# Patient Record
Sex: FEMALE | Race: BLACK OR AFRICAN AMERICAN | NOT HISPANIC OR LATINO | Employment: UNEMPLOYED | URBAN - METROPOLITAN AREA
[De-identification: names, ages, dates, MRNs, and addresses within clinical notes are randomized per-mention and may not be internally consistent; named-entity substitution may affect disease eponyms.]

---

## 2019-05-10 PROBLEM — M41.9 SCOLIOSIS OF LUMBAR SPINE: Status: ACTIVE | Noted: 2019-05-10

## 2019-08-04 PROBLEM — G89.29 CHRONIC BACK PAIN: Status: ACTIVE | Noted: 2019-08-04

## 2019-08-04 PROBLEM — M79.605 PAIN IN BOTH LOWER EXTREMITIES: Status: ACTIVE | Noted: 2019-08-04

## 2019-08-04 PROBLEM — M41.125 ADOLESCENT IDIOPATHIC SCOLIOSIS OF THORACOLUMBAR REGION: Status: ACTIVE | Noted: 2019-08-04

## 2019-08-04 PROBLEM — M54.9 CHRONIC BACK PAIN: Status: ACTIVE | Noted: 2019-08-04

## 2019-08-04 PROBLEM — M79.604 PAIN IN BOTH LOWER EXTREMITIES: Status: ACTIVE | Noted: 2019-08-04

## 2020-01-15 PROBLEM — M43.25 FUSION OF SPINE OF THORACOLUMBAR REGION: Status: ACTIVE | Noted: 2020-01-15

## 2020-01-22 PROBLEM — N92.6 IRREGULAR MENSTRUATION: Status: ACTIVE | Noted: 2020-01-22

## 2020-01-22 PROBLEM — N89.8 VAGINAL DISCHARGE: Status: ACTIVE | Noted: 2020-01-22

## 2020-01-22 PROBLEM — R63.0 DECREASED APPETITE: Status: ACTIVE | Noted: 2020-01-22

## 2020-01-22 PROBLEM — R63.4 WEIGHT LOSS: Status: ACTIVE | Noted: 2020-01-22

## 2020-02-05 PROBLEM — N91.1 SECONDARY AMENORRHEA: Status: ACTIVE | Noted: 2020-02-05

## 2020-05-11 PROBLEM — B37.31 VAGINAL YEAST INFECTION: Status: ACTIVE | Noted: 2020-05-11

## 2020-05-11 PROBLEM — L29.9 ITCHING: Status: ACTIVE | Noted: 2020-05-11

## 2020-09-22 PROBLEM — S93.105A: Status: ACTIVE | Noted: 2020-09-22

## 2020-09-22 PROBLEM — Z91.040 LATEX ALLERGY: Status: ACTIVE | Noted: 2018-11-12

## 2020-09-24 PROBLEM — N89.8 VAGINAL DISCHARGE: Status: RESOLVED | Noted: 2020-01-22 | Resolved: 2020-09-24

## 2020-09-24 PROBLEM — L29.9 ITCHING: Status: RESOLVED | Noted: 2020-05-11 | Resolved: 2020-09-24

## 2020-09-24 PROBLEM — N91.1 SECONDARY AMENORRHEA: Status: RESOLVED | Noted: 2020-02-05 | Resolved: 2020-09-24

## 2020-09-24 PROBLEM — N92.6 IRREGULAR MENSTRUATION: Status: RESOLVED | Noted: 2020-01-22 | Resolved: 2020-09-24

## 2020-09-24 PROBLEM — M79.604 PAIN IN BOTH LOWER EXTREMITIES: Status: RESOLVED | Noted: 2019-08-04 | Resolved: 2020-09-24

## 2020-09-24 PROBLEM — M79.605 PAIN IN BOTH LOWER EXTREMITIES: Status: RESOLVED | Noted: 2019-08-04 | Resolved: 2020-09-24

## 2020-09-24 PROBLEM — B37.31 VAGINAL YEAST INFECTION: Status: RESOLVED | Noted: 2020-05-11 | Resolved: 2020-09-24

## 2020-09-24 PROBLEM — Z01.818 PRE-OP EVALUATION: Status: ACTIVE | Noted: 2020-09-24

## 2020-09-24 PROBLEM — F12.90 MARIJUANA USE: Status: ACTIVE | Noted: 2020-09-24

## 2020-09-25 PROBLEM — E55.9 VITAMIN D DEFICIENCY: Chronic | Status: ACTIVE | Noted: 2020-09-25

## 2020-09-27 PROBLEM — R63.4 WEIGHT LOSS: Status: RESOLVED | Noted: 2020-01-22 | Resolved: 2020-09-27

## 2020-09-27 PROBLEM — Z91.040 LATEX ALLERGY: Status: RESOLVED | Noted: 2018-11-12 | Resolved: 2020-09-27

## 2020-09-27 PROBLEM — Z01.818 PRE-OP EVALUATION: Status: RESOLVED | Noted: 2020-09-24 | Resolved: 2020-09-27

## 2020-09-27 PROBLEM — R63.0 DECREASED APPETITE: Status: RESOLVED | Noted: 2020-01-22 | Resolved: 2020-09-27

## 2020-09-27 PROBLEM — M43.25 FUSION OF SPINE OF THORACOLUMBAR REGION: Status: RESOLVED | Noted: 2020-01-15 | Resolved: 2020-09-27

## 2020-09-27 PROBLEM — M41.125 ADOLESCENT IDIOPATHIC SCOLIOSIS OF THORACOLUMBAR REGION: Status: RESOLVED | Noted: 2019-08-04 | Resolved: 2020-09-27

## 2020-09-27 PROBLEM — M41.116 JUVENILE IDIOPATHIC SCOLIOSIS OF LUMBAR REGION: Status: ACTIVE | Noted: 2019-05-10

## 2020-09-28 PROBLEM — Z98.890 STATUS POST SURGERY: Status: ACTIVE | Noted: 2020-09-28

## 2020-09-28 PROBLEM — S93.105A: Status: ACTIVE | Noted: 2020-09-28

## 2021-02-26 PROBLEM — M25.511 RIGHT SHOULDER PAIN: Status: ACTIVE | Noted: 2021-02-26

## 2021-02-26 PROBLEM — Z20.2 STD EXPOSURE: Status: ACTIVE | Noted: 2021-02-26

## 2021-05-12 ENCOUNTER — PATIENT MESSAGE (OUTPATIENT)
Dept: RESEARCH | Facility: HOSPITAL | Age: 26
End: 2021-05-12

## 2021-11-12 PROBLEM — Z20.2 STD EXPOSURE: Status: RESOLVED | Noted: 2021-02-26 | Resolved: 2021-11-12

## 2022-01-19 ENCOUNTER — PATIENT MESSAGE (OUTPATIENT)
Dept: ADMINISTRATIVE | Facility: OTHER | Age: 27
End: 2022-01-19
Payer: MEDICAID

## 2022-09-19 PROBLEM — R22.31 LUMP IN ARMPIT, RIGHT: Status: ACTIVE | Noted: 2022-09-19

## 2023-06-13 ENCOUNTER — PATIENT MESSAGE (OUTPATIENT)
Dept: RESEARCH | Facility: HOSPITAL | Age: 28
End: 2023-06-13
Payer: MEDICAID

## 2023-06-20 ENCOUNTER — PATIENT MESSAGE (OUTPATIENT)
Dept: RESEARCH | Facility: HOSPITAL | Age: 28
End: 2023-06-20
Payer: MEDICAID

## 2023-07-05 ENCOUNTER — PATIENT MESSAGE (OUTPATIENT)
Dept: RESEARCH | Facility: HOSPITAL | Age: 28
End: 2023-07-05
Payer: MEDICAID

## 2024-07-08 PROBLEM — Z98.890 STATUS POST SURGERY: Status: RESOLVED | Noted: 2020-09-28 | Resolved: 2024-07-08

## 2024-07-08 PROBLEM — R22.31 LUMP IN ARMPIT, RIGHT: Status: RESOLVED | Noted: 2022-09-19 | Resolved: 2024-07-08

## 2025-04-11 ENCOUNTER — ON-DEMAND VIRTUAL (OUTPATIENT)
Dept: URGENT CARE | Facility: CLINIC | Age: 30
End: 2025-04-11
Payer: MEDICAID

## 2025-04-11 DIAGNOSIS — N64.52 NIPPLE DISCHARGE IN FEMALE: Primary | ICD-10-CM

## 2025-04-11 NOTE — PROGRESS NOTES
Subjective:      Patient ID: Michael Parnell is a 29 y.o. female.    Vitals:  vitals were not taken for this visit.     Chief Complaint: Breast Problem      Visit Type: TELE AUDIOVISUAL - This visit was conducted virtually based on  subjective information and limited objective exam    Present with the patient at the time of consultation: TELEMED PRESENT WITH PATIENT: None  LOCATION OF PATIENT [place]  Two patient identifiers used to verify patient- saying out date of birth and full name.       Past Medical History:   Diagnosis Date    Adolescent idiopathic scoliosis of thoracolumbar region 8/4/2019    Decreased appetite January 2020 1/22/2020    Encounter for contraceptive management, unspecified 11/6/2014    Fusion of spine of thoracolumbar region for scoliosis 01 Dec 2016 1/15/2020    Irregular menstruation 1/22/2020    Itching 5/11/2020    Juvenile idiopathic scoliosis of lumbar region 5/10/2019    Latex allergy 11/12/2018    LT 3rd Toe PIP Joint Dislocation Onset 11 September 2020 9/22/2020    Marijuana use + Urine Drug Screen 21 Sept 2020 9/24/2020    Pain in both lower extremities 8/4/2019    Pre-op evaluation for surgery 28 Sept 2020 9/24/2020    Scoliosis     Scoliosis     Secondary amenorrhea 2/5/2020    Status post LT 3rd Toe Closed Reduction Under Anesthesia 28 Sept 2020 9/28/2020    STD exposure 2/26/2021    Tooth pain     Vaginal discharge 1/22/2020    Vaginal yeast infection 5/11/2020    Vitamin D deficiency 24.0 on 24 Sept 2020 9/25/2020    Weight loss January 2020 1/22/2020     Past Surgical History:   Procedure Laterality Date    CLOSED REDUCTION Left 9/28/2020    Procedure: CLOSED REDUCTION, 3rd TOE DISLOCATION;  Surgeon: Babak Srinivasan MD;  Location: [facility] MAIN OR;  Service: Orthopedics;  Laterality: Left;    CLOSED REDUCTION TOE DISLOCATION Left 09/28/2020    LT 3rd Toe Closed Reduction Under Anesthesia. Dr Taylor [facility] [place]    SCOLIOSIS REPAIR  12/01/2016    Scoliosis  Surgery:  T4- L3 PSF w SPO at T9[facility]12 Dr. Zarco.  [place], [place]     Review of patient's allergies indicates:   Allergen Reactions    Latex Dermatitis     Mild, itching and discharge     Medications Ordered Prior to Encounter[1]  Family History   Problem Relation Name Age of Onset    No Known Problems Mother      No Known Problems Father         Medications Ordered                [address] [address] [facility]   [address] [facility] [address]    Telephone: [phone]   Fax: [phone]   Hours: Not open 24 hours                         E-Prescribed (1 of 1)              bacitracin zinc-polymyxin B (POLYSPORIN) ointment    Sig: Apply topically 2 (two) times daily. for 7 days       Start: 4/11/25     Quantity: 28 g Refills: 0                           Ohs Peq Odvv Intake    4/11/2025  4:17 PM CDT - Filed by Patient   What is your current physical address in the event of a medical emergency? [address] [place]   Are you able to take your vital signs? No   Please attach any relevant images or files    Is your employer contracted with [facility]? No         28 yo female c/o Left nipple pain, swelling of nipple and intermittent pus drainage from piercing site for the past two days.   Reports having the piercing 5 years ago with no change in jewlery or soap products.   Similar sx about 1 year ago which resolved with cleansing with witch hazel.     Denies any skin changes, fever, chills, N/V, yellow pus drainage all the time, mass in breast.             Constitution: Negative for activity change, appetite change, chills, fever and generalized weakness.   Skin:  Positive for erythema. Negative for color change, rash and abscess.        Left nipple swelling, intermittent discharge from nipple and tenderness of nipple. No acute abnl of Left breast.         Objective:   The physical exam was conducted virtually.    Pt is alert and  oriented.  Speech is clear and coherent.  Speaking in complete sentences.  No distress.  Mood and affect are normal.     Limited physical exam due to virtual visit.         Assessment:     1. Nipple discharge in female        Plan:     Keep it clean with sterile saline solution 2x/day  Avoid twisting or over-cleaning (can delay healing)  Wear breathable, non-irritating clothing  Continue saline soaks  Apply topical antibiotic like bacitracin or mupirocin (unless allergic)  If worsening: you may need an oral antibiotic (like cephalexin, clindamycin, or doxycycline depending on severity and allergies)        Thank you for choosing [facility]!    Our goal in the [facility] is to always provide outstanding medical care. You may receive a survey by mail or e-mail in the next week regarding your experience today. We would greatly appreciate you completing and returning the survey. Your feedback provides us with a way to recognize our staff who provide very good care, and it helps us learn how to improve when your experience was below our aspiration of excellence.         We appreciate you trusting us with your medical care. We hope you feel better soon. We will be happy to take care of you for all of your future medical needs.    You must understand that you've received an Urgent Care treatment only and that you may be released before all your medical problems are known or treated. You, the patient, will arrange for follow up care as instructed.    Follow up with your PCP or specialty clinic as directed in the next 1-2 weeks if not improved or as needed.  You can call [phone] to schedule an appointment with the appropriate provider.    If your condition worsens we recommend that you receive another evaluation in person, with your primary care provider, urgent care or at the emergency room immediately or contact your primary medical clinics after hours call service to discuss  your concerns.         Nipple discharge in female  Comments:  rx bacitracin x 7 days  if sx persist or worsen will rx oral abx  monitor sx  Orders:  -     bacitracin zinc-polymyxin B (POLYSPORIN) ointment; Apply topically 2 (two) times daily. for 7 days  Dispense: 28 g; Refill: 0                         [1]   Current Outpatient Medications on File Prior to Visit   Medication Sig Dispense Refill    pantoprazole (PROTONIX) 40 MG tablet Take 1 tablet (40 mg total) by mouth once daily. 30 tablet 0     No current facility-administered medications on file prior to visit.

## 2025-08-05 ENCOUNTER — ON-DEMAND VIRTUAL (OUTPATIENT)
Dept: URGENT CARE | Facility: CLINIC | Age: 30
End: 2025-08-05
Payer: MEDICAID

## 2025-08-05 DIAGNOSIS — B37.9 ANTIBIOTIC-INDUCED YEAST INFECTION: Primary | ICD-10-CM

## 2025-08-05 DIAGNOSIS — T36.95XA ANTIBIOTIC-INDUCED YEAST INFECTION: Primary | ICD-10-CM

## 2025-08-05 PROCEDURE — 98005 SYNCH AUDIO-VIDEO EST LOW 20: CPT | Mod: 95,,, | Performed by: NURSE PRACTITIONER

## 2025-08-05 RX ORDER — FLUCONAZOLE 150 MG/1
150 TABLET ORAL DAILY
Qty: 2 TABLET | Refills: 0 | Status: SHIPPED | OUTPATIENT
Start: 2025-08-05 | End: 2025-08-07

## 2025-08-05 NOTE — PROGRESS NOTES
Subjective:      Patient ID: Michael Parnell is a 29 y.o. female.    Vitals:  vitals were not taken for this visit.     Chief Complaint: Vaginitis      Visit Type: TELE AUDIOVISUAL - This visit was conducted virtually based on  subjective information and limited objective exam    Present with the patient at the time of consultation: TELEMED PRESENT WITH PATIENT: None  LOCATION OF PATIENT [place]  Two patient identifiers used to verify patient- saying out date of birth and full name.       Past Medical History:   Diagnosis Date    Adolescent idiopathic scoliosis of thoracolumbar region 8/4/2019    Decreased appetite January 2020 1/22/2020    Encounter for contraceptive management, unspecified 11/6/2014    Fusion of spine of thoracolumbar region for scoliosis 01 Dec 2016 1/15/2020    Irregular menstruation 1/22/2020    Itching 5/11/2020    Juvenile idiopathic scoliosis of lumbar region 5/10/2019    Latex allergy 11/12/2018    LT 3rd Toe PIP Joint Dislocation Onset 11 September 2020 9/22/2020    Marijuana use + Urine Drug Screen 21 Sept 2020 9/24/2020    Pain in both lower extremities 8/4/2019    Pre-op evaluation for surgery 28 Sept 2020 9/24/2020    Scoliosis     Scoliosis     Secondary amenorrhea 2/5/2020    Status post LT 3rd Toe Closed Reduction Under Anesthesia 28 Sept 2020 9/28/2020    STD exposure 2/26/2021    Tooth pain     Vaginal discharge 1/22/2020    Vaginal yeast infection 5/11/2020    Vitamin D deficiency 24.0 on 24 Sept 2020 9/25/2020    Weight loss January 2020 1/22/2020     Past Surgical History:   Procedure Laterality Date    CLOSED REDUCTION Left 9/28/2020    Procedure: CLOSED REDUCTION, 3rd TOE DISLOCATION;  Surgeon: Babak Srinivasan MD;  Location: [facility] MAIN OR;  Service: Orthopedics;  Laterality: Left;    CLOSED REDUCTION TOE DISLOCATION Left 09/28/2020    LT 3rd Toe Closed Reduction Under Anesthesia. Dr Taylor [facility] [place]    SCOLIOSIS REPAIR  12/01/2016    Scoliosis Surgery:   T4- L3 PSF w SPO at T[facility] Dr. Zarco.  [place], [place]     Review of patient's allergies indicates:   Allergen Reactions    Latex Dermatitis     Mild, itching and discharge     Medications Ordered Prior to Encounter[1]  Family History   Problem Relation Name Age of Onset    No Known Problems Mother      No Known Problems Father         Medications Ordered                [facility] [address] [facility]   [address] [facility] [address]    Telephone: [phone]   Fax: [phone]   Hours: Not open 24 hours                         E-Prescribed (1 of 1)              fluconazole (DIFLUCAN) 150 MG Tab    Sig: Take 1 tablet (150 mg total) by mouth once daily. Take one now and may repeat in 72 h prn for 2 doses       Start: 8/5/25     Quantity: 2 tablet Refills: 0                           Ohs Peq Odvv Intake    8/5/2025  9:47 AM CDT - Filed by Patient   What is your current physical address in the event of a medical emergency? [address]   Are you able to take your vital signs? No   Please attach any relevant images or files    Ohs Peq Odvv Preferred Language          30 yo female with c/o vaginal irritation. She has cyst on her face and has been taking abx and tried otc cream and it's not helping.         Constitution: Negative.   HENT: Negative.     Neck: neck negative.   Cardiovascular: Negative.    Eyes: Negative.    Respiratory: Negative.     Endocrine: negative.   Genitourinary:  Positive for vaginal discharge and vaginal odor. Negative for dysuria, frequency, urgency and urine decreased.   Skin: Negative.    Allergic/Immunologic: Negative.    Neurological: Negative.    Hematologic/Lymphatic: Negative.    Psychiatric/Behavioral: Negative.          Objective:   The physical exam was conducted virtually.    AAO x 3 ; no acute distress noted; appearance normal; mood and behavior normal; thought process normal  Head- normocephalic  Nose- appears normal,  no discharge or erythema  Eyes- pupils appear normal in size, no drainage, no erythema  Ears- normal appearing; no discharge, no erythema  Mouth- appears normal  Oropharynx- no erythema, lesions  Lungs- breathing at a normal rate, no acute distress noted  Heart- no reports of tachycardia, palpitations, chest pain  Abdomen- non distended, non tender reported by patient  Skin- warm and dry, no erythema or edema noted by patient or visualized  Psych- as above; no si/hi      Assessment:     1. Antibiotic-induced yeast infection        Plan:       Take diflucan now and take second one when complete abx      Thank you for choosing [facility]!    Our goal in the [facility] is to always provide outstanding medical care. You may receive a survey by mail or e-mail in the next week regarding your experience today. We would greatly appreciate you completing and returning the survey. Your feedback provides us with a way to recognize our staff who provide very good care, and it helps us learn how to improve when your experience was below our aspiration of excellence.         We appreciate you trusting us with your medical care. We hope you feel better soon. We will be happy to take care of you for all of your future medical needs.    You must understand that you've received an Urgent Care treatment only and that you may be released before all your medical problems are known or treated. You, the patient, will arrange for follow up care as instructed.    Follow up with your PCP or specialty clinic as directed in the next 1-2 weeks if not improved or as needed.  You can call [phone] to schedule an appointment with the appropriate provider.    If your condition worsens we recommend that you receive another evaluation in person, with your primary care provider, urgent care or at the emergency room immediately or contact your primary medical clinics after hours call service to discuss your  concerns.         Antibiotic-induced yeast infection  -     fluconazole (DIFLUCAN) 150 MG Tab; Take 1 tablet (150 mg total) by mouth once daily. Take one now and may repeat in 72 h prn for 2 doses  Dispense: 2 tablet; Refill: 0                         [1]   Current Outpatient Medications on File Prior to Visit   Medication Sig Dispense Refill    gabapentin (NEURONTIN) 100 MG capsule Take 1 capsule (100 mg total) by mouth 3 (three) times daily. (Patient not taking: Reported on 7/28/2025) 90 capsule 1     No current facility-administered medications on file prior to visit.